# Patient Record
Sex: MALE
[De-identification: names, ages, dates, MRNs, and addresses within clinical notes are randomized per-mention and may not be internally consistent; named-entity substitution may affect disease eponyms.]

---

## 2018-11-01 ENCOUNTER — TRANSCRIPTION ENCOUNTER (OUTPATIENT)
Age: 38
End: 2018-11-01

## 2018-12-26 ENCOUNTER — TRANSCRIPTION ENCOUNTER (OUTPATIENT)
Age: 38
End: 2018-12-26

## 2021-05-16 ENCOUNTER — TRANSCRIPTION ENCOUNTER (OUTPATIENT)
Age: 41
End: 2021-05-16

## 2022-10-04 PROBLEM — Z00.00 ENCOUNTER FOR PREVENTIVE HEALTH EXAMINATION: Status: ACTIVE | Noted: 2022-10-04

## 2022-10-10 ENCOUNTER — APPOINTMENT (OUTPATIENT)
Dept: ORTHOPEDIC SURGERY | Facility: CLINIC | Age: 42
End: 2022-10-10

## 2022-10-10 ENCOUNTER — RX RENEWAL (OUTPATIENT)
Age: 42
End: 2022-10-10

## 2022-10-10 VITALS — BODY MASS INDEX: 30.1 KG/M2 | HEIGHT: 71 IN | WEIGHT: 215 LBS

## 2022-10-10 DIAGNOSIS — S39.012A STRAIN OF MUSCLE, FASCIA AND TENDON OF LOWER BACK, INITIAL ENCOUNTER: ICD-10-CM

## 2022-10-10 DIAGNOSIS — Z78.9 OTHER SPECIFIED HEALTH STATUS: ICD-10-CM

## 2022-10-10 DIAGNOSIS — M51.26 OTHER INTERVERTEBRAL DISC DISPLACEMENT, LUMBAR REGION: ICD-10-CM

## 2022-10-10 PROCEDURE — 99213 OFFICE O/P EST LOW 20 MIN: CPT

## 2022-10-10 PROCEDURE — 72110 X-RAY EXAM L-2 SPINE 4/>VWS: CPT

## 2022-10-10 RX ORDER — MELOXICAM 15 MG/1
15 TABLET ORAL DAILY
Qty: 90 | Refills: 0 | Status: ACTIVE | COMMUNITY
Start: 2022-10-10 | End: 1900-01-01

## 2022-10-10 NOTE — PHYSICAL EXAM
[Flexion] : flexion [Extension] : extension [Bending to left] : bending to left [Bending to right] : bending to right [4___] : left hamstring 4[unfilled]/5 [] : no swelling

## 2022-10-10 NOTE — ASSESSMENT
[FreeTextEntry1] : General Dx Discussion\par The patient was advised of the diagnosis. The natural history of the pathology was explained in full to the patient in layman's terms. All questions were answered. The risks and benefits of surgical and non-surgical treatment alternatives were explained in full to the patient.\par \par Recommend a course of physical therapy.\par Advised to d/c aleve and would recommend Mobic. \par Discussed getting an mri if symptoms persist. \par \par The patient was prescribed an anti- inflammatory medication at today's visit. The patient was advised that this medication should be taken with food as they can cause gastrointestinal upset. They patient is also aware that these medications may exacerbate any pre existing hypertension and they should speak with their medical doctor before starting the medication. They were advised to stop the medication if they experience any stomach upset or develop headaches or blurry vision.\par

## 2022-10-10 NOTE — HISTORY OF PRESENT ILLNESS
[Lower back] : lower back [8] : 8 [3] : 3 [Dull/Aching] : dull/aching [Sharp] : sharp [Constant] : constant [Meds] : meds [Heat] : heat [Sitting] : sitting [Standing] : standing [de-identified] : 42 y/o M here for further evaluation of back pain. He states 3 months ago he developed a kidney stone and started having back pain. He went on a roller coaster and passed his kidney stone. Then he sat in a 2 hour car ride and then started having significant back pain that was different then the pain he had with the kidney stone. He did have pain radiate down his legs to his knees, but that resolved. He has a h/o back issues in the past in 2012. He was treated with PT for 10 weeks without relief and then was treated with 2 CHERISE's and felt better. He was diagnosed with a herniated disc at L4/5 at that time.  Recently he has been taking aleve, using a lidocaine patch and using an infrared machine which has given him partial relief. \par \par Occupation: [employer]\par Allergies: PCN [] : no [FreeTextEntry3] : CHRONIC [FreeTextEntry5] : PT STATED HE HAS HISTORY OF HERNIATED DISCS RECENTLY WAS WORKING OUT AND SHORTLY AFTER HAS ESCALATING BACK PAIN [FreeTextEntry7] : LOWER BACK TO KNEE  [de-identified] : 2

## 2022-11-05 ENCOUNTER — TRANSCRIPTION ENCOUNTER (OUTPATIENT)
Age: 42
End: 2022-11-05

## 2022-11-21 ENCOUNTER — APPOINTMENT (OUTPATIENT)
Dept: ORTHOPEDIC SURGERY | Facility: CLINIC | Age: 42
End: 2022-11-21

## 2022-11-30 ENCOUNTER — NON-APPOINTMENT (OUTPATIENT)
Age: 42
End: 2022-11-30

## 2022-12-31 ENCOUNTER — NON-APPOINTMENT (OUTPATIENT)
Age: 42
End: 2022-12-31

## 2023-03-09 ENCOUNTER — NON-APPOINTMENT (OUTPATIENT)
Age: 43
End: 2023-03-09

## 2023-12-01 ENCOUNTER — NON-APPOINTMENT (OUTPATIENT)
Age: 43
End: 2023-12-01

## 2025-01-30 ENCOUNTER — NON-APPOINTMENT (OUTPATIENT)
Age: 45
End: 2025-01-30